# Patient Record
Sex: FEMALE | Race: ASIAN | Employment: STUDENT | URBAN - METROPOLITAN AREA
[De-identification: names, ages, dates, MRNs, and addresses within clinical notes are randomized per-mention and may not be internally consistent; named-entity substitution may affect disease eponyms.]

---
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
## 2022-01-04 ENCOUNTER — TELEMEDICINE (OUTPATIENT)
Dept: FAMILY MEDICINE CLINIC | Facility: CLINIC | Age: 22
End: 2022-01-04
Payer: COMMERCIAL

## 2022-01-04 DIAGNOSIS — N93.9 ABNORMAL UTERINE BLEEDING: ICD-10-CM

## 2022-01-04 DIAGNOSIS — Z30.09 GENERAL COUNSELING FOR INITIATION OF OTHER CONTRACEPTIVE MEASURES: ICD-10-CM

## 2022-01-04 DIAGNOSIS — R30.0 DYSURIA: Primary | ICD-10-CM

## 2022-01-04 PROCEDURE — 99212 OFFICE O/P EST SF 10 MIN: CPT | Performed by: STUDENT IN AN ORGANIZED HEALTH CARE EDUCATION/TRAINING PROGRAM

## 2022-01-04 NOTE — PROGRESS NOTES
Virtual Regular Visit    Verification of patient location:    Patient is located in the following state in which I hold an active license [place]      Assessment/Plan:    Problem List Items Addressed This Visit     None      Visit Diagnoses     Dysuria    -  Primary    Relevant Orders    UA w Reflex to Microscopic w Reflex to Culture -Lab Collect    Chlamydia/GC amplified DNA by PCR    HIV 1/2 Antigen/Antibody (4th Generation) w Reflex [facility]    RPR    Abnormal uterine bleeding        General counseling for initiation of other contraceptive measures          · For the dysuria will order UA with reflex to culture and STD testings to rule out any infectious causes  · For the abnormal uterine bleeding, patient is to keep a log of symptoms, as it has only occurred once previously with the discomfort during voiding  Will rule out infectious causes as well with blood work and urine testing  She would benefit from and in office evaluation for a pelvic and vaginal examination  · Counseled on various ones of contraception, their effectiveness and side effects  As for contraception, she would like to try a copper IUD, unfortunately given her abnormal uterine bleeding she is not a candidate for the copper IUD at this time given the risk of increased bleeding  Will workup causes of her abnormal uterine bleeding and will need to offer another form of contraception in the future  Patient to continue using her usual contraception in the meantime  Counseled patient on safe sex practices to avoid STIs         Reason for visit is   Chief Complaint   Patient presents with    Virtual Regular Visit        Encounter provider Lona Mullins MD    Provider located at [address] [place] [id number]      Recent Visits  Date Type Provider Dept   01/04/22 MD Federico Cash   Showing recent visits within past 7 days and meeting all other requirements  Future Appointments  No visits were found meeting these conditions  Showing future appointments within next 150 days and meeting all other requirements       The patient was identified by name and date of birth  Yvonne Mohan was informed that this is a telemedicine visit and that the visit is being conducted through [address] [facility] and patient was informed that this is a secure, HIPAA-compliant platform  She agrees to proceed     My office door was closed  The patient was notified the following individuals were present in the room JORDYN landaverde  She acknowledged consent and understanding of privacy and security of the video platform  The patient has agreed to participate and understands they can discontinue the visit at any time  Patient is aware this is a billable service  Spring Peacock is a 25 y o  female     Urinary discomfort/spotting   she reports she has been having some spots of blood when using the bathroom  she would see blood on the tissue paper  It occurred 4-5 times last month, and this is the first time this has happened  Is has not occurred again  LMP: started on 1/1/2022  Did not have menstrual period in December 2021  Prior to December her last menstrual period was on 11/01/2021  She reports has a regular cycle, every 30 days, 5-7 days of bleeding, that goes from heavy to light  She into the urgent care for a urine dipstick and the urine pregnancy test was negative  The urine dipstick did show trace blood, no leukocytes or nitrites  Urine culture was negative  She denies any blood in her urine  She reports the following has been great since that 1 episode of the bleeding: On and off dysuria, no urgency or frequency  Normal vaginal discharge without difference in color, no blood  No blood in stool, no rectal pain  No rashes or lesions in the genital area  No fever      She reports she is sexually active with 1 male partner for the past 5 months  She has had 1 other male partner in the past year  Male partner wears condoms  She does not have any history of STDs  Contraception:  Has not been on anything prior  Would want more information  Past Medical History:   Diagnosis Date    Anxiety     Depression     Headache(784 0)        Past Surgical History:   Procedure Laterality Date    APPENDECTOMY      TONSILLECTOMY         Current Outpatient Medications   Medication Sig Dispense Refill    hydrOXYzine HCL (ATARAX) 25 mg tablet TAKE 1 TO 2 TABLETS BY MOUTH EVERY 6 HOURS AS NEEDED FOR 3 DAYS (Patient not taking: Reported on 9/6/2021)      ibuprofen (MOTRIN) 400 mg tablet Take 1 tablet by mouth every 6 (six) hours as needed for mild pain (Patient not taking: Reported on 3/15/2021) 30 tablet 0    sertraline (ZOLOFT) 25 mg tablet Take 25 mg by mouth daily (Patient not taking: Reported on 9/6/2021)       No current facility-administered medications for this visit  No Known Allergies    Review of Systems    Video Exam    There were no vitals filed for this visit  Physical Exam     I spent 15 minutes directly with the patient during this visit    Marianna Luis Manuel [facility] verbally agrees to participate in [employer]  Pt is aware that [employer] could be limited without vital signs or the ability to perform a full hands-on physical Lori Jackson understands she or the provider may request at any time to terminate the video visit and request the patient to seek care or treatment in person

## 2022-02-07 ENCOUNTER — ANNUAL EXAM (OUTPATIENT)
Dept: FAMILY MEDICINE CLINIC | Facility: CLINIC | Age: 22
End: 2022-02-07
Payer: COMMERCIAL

## 2022-02-07 VITALS
DIASTOLIC BLOOD PRESSURE: 70 MMHG | SYSTOLIC BLOOD PRESSURE: 100 MMHG | TEMPERATURE: 97.7 F | RESPIRATION RATE: 16 BRPM | BODY MASS INDEX: 30.79 KG/M2 | WEIGHT: 163.1 LBS | HEIGHT: 61 IN | HEART RATE: 84 BPM | OXYGEN SATURATION: 99 %

## 2022-02-07 DIAGNOSIS — Z72.51 UNPROTECTED SEX: ICD-10-CM

## 2022-02-07 DIAGNOSIS — Z11.3 SCREENING EXAMINATION FOR STD (SEXUALLY TRANSMITTED DISEASE): ICD-10-CM

## 2022-02-07 DIAGNOSIS — R35.0 URINARY FREQUENCY: ICD-10-CM

## 2022-02-07 DIAGNOSIS — Z30.09 BIRTH CONTROL COUNSELING: ICD-10-CM

## 2022-02-07 DIAGNOSIS — Z11.3 SCREENING FOR STDS (SEXUALLY TRANSMITTED DISEASES): Primary | ICD-10-CM

## 2022-02-07 LAB
SL AMB  POCT GLUCOSE, UA: NORMAL
SL AMB LEUKOCYTE ESTERASE,UA: NEGATIVE
SL AMB POCT BILIRUBIN,UA: NEGATIVE
SL AMB POCT BLOOD,UA: NEGATIVE
SL AMB POCT CLARITY,UA: CLEAR
SL AMB POCT COLOR,UA: YELLOW
SL AMB POCT KETONES,UA: NEGATIVE
SL AMB POCT NITRITE,UA: NEGATIVE
SL AMB POCT PH,UA: 6
SL AMB POCT SPECIFIC GRAVITY,UA: 1.01
SL AMB POCT URINE HCG: NEGATIVE
SL AMB POCT URINE PROTEIN: NEGATIVE
SL AMB POCT UROBILINOGEN: NORMAL

## 2022-02-07 PROCEDURE — 99395 PREV VISIT EST AGE 18-39: CPT | Performed by: FAMILY MEDICINE

## 2022-02-07 PROCEDURE — 1036F TOBACCO NON-USER: CPT | Performed by: FAMILY MEDICINE

## 2022-02-07 PROCEDURE — 81002 URINALYSIS NONAUTO W/O SCOPE: CPT | Performed by: FAMILY MEDICINE

## 2022-02-07 PROCEDURE — 3008F BODY MASS INDEX DOCD: CPT | Performed by: FAMILY MEDICINE

## 2022-02-07 PROCEDURE — 81025 URINE PREGNANCY TEST: CPT | Performed by: FAMILY MEDICINE

## 2022-02-07 NOTE — PROGRESS NOTES
Wallace Comes  25 y o   02/08/22      CC: gyn exam   Care gaps addressed this visit: Std screening   Follow up: Mirena     Problem List Items Addressed This Visit     None      Visit Diagnoses     Screening for STDs (sexually transmitted diseases)    -  Primary    Relevant Orders    Chlamydia/GC amplified DNA by PCR    Screening examination for STD (sexually transmitted disease)        Relevant Orders    RPR    Human Immunodeficiency Virus 1/2 Antigen / Antibody ( Fourth Generation) with Reflex Testing    Hepatitis C antibody    Unprotected sex        Relevant Orders    Chlamydia/GC amplified DNA by PCR    POCT urine HCG (Completed)    Urinary frequency        Relevant Orders    POCT urine dip (Completed)    Birth control counseling               Assessment and Plan     Screened for chlamydia and gonorrhea with cervical sample today in the office  Gyn exam was normal   Patient given blood work for STD screening and she is to follow up with scheduling Mirena insertion  In regards to patient's urinary frequency which seems to be intermittent and chronic, I asked patient to monitor her symptoms for now as her UA was normal   If she continues to have issues, she may benefit from a consultation from a urologist for evaluation of interstitial cystitis   Patient understands and agrees with the plan  Plan discussed with attending- Dr Sarah Hurley:      Periods are regular for the most part except for December when she skipped a period and Jan when she had two periods in 1 month  Periods usually last for 3-4 days  Cramping is minimal       She has had 2 male sexual partners  She is currently sexually active with her male boyfriend  They do not use protection She is interested in starting contraception  Patient has been having urinary frequency and urinary discomfort for 3 months  Urine culture in December was negative  Review of Systems   Constitutional: Negative for chills and fever  Respiratory: Negative for shortness of breath  Cardiovascular: Negative for chest pain  Gastrointestinal: Negative for abdominal pain, diarrhea, nausea and vomiting  Genitourinary: Positive for frequency  Negative for difficulty urinating, dyspareunia, dysuria, genital sores, pelvic pain, urgency, vaginal bleeding, vaginal discharge and vaginal pain  Skin: Negative for rash and wound  Neurological: Negative for dizziness, syncope and headaches  The following portions of the patient's history were reviewed and updated as appropriate:  current medications, past family history, past medical history, past social history, past surgical history and problem list     Current Outpatient Medications on File Prior to Visit   Medication Sig Dispense Refill    hydrOXYzine HCL (ATARAX) 25 mg tablet TAKE 1 TO 2 TABLETS BY MOUTH EVERY 6 HOURS AS NEEDED FOR 3 DAYS (Patient not taking: Reported on 9/6/2021)      ibuprofen (MOTRIN) 400 mg tablet Take 1 tablet by mouth every 6 (six) hours as needed for mild pain (Patient not taking: Reported on 3/15/2021) 30 tablet 0    sertraline (ZOLOFT) 25 mg tablet Take 25 mg by mouth daily (Patient not taking: Reported on 9/6/2021)       No current facility-administered medications on file prior to visit  Objective:    /70 (BP Location: Left arm, Patient Position: Sitting, Cuff Size: Standard)   Pulse 84   Temp 97 7 °F (36 5 °C) (Tympanic)   Resp 16   Ht 5' 1" (1 549 m)   Wt 74 kg (163 lb 1 6 oz)   SpO2 99%   BMI 30 82 kg/m²     Physical Exam  Constitutional:       General: She is not in acute distress  Appearance: She is not ill-appearing  HENT:      Head: Normocephalic and atraumatic  Right Ear: External ear normal       Left Ear: External ear normal       Nose: Nose normal    Cardiovascular:      Rate and Rhythm: Normal rate and regular rhythm  Pulmonary:      Effort: Pulmonary effort is normal  No respiratory distress     Abdominal: Hernia: There is no hernia in the left inguinal area or right inguinal area  Genitourinary:     Labia:         Right: No rash, tenderness, lesion or injury  Left: No rash, tenderness, lesion or injury  Vagina: Normal       Cervix: Normal       Uterus: Normal        Adnexa: Right adnexa normal and left adnexa normal    Lymphadenopathy:      Lower Body: No right inguinal adenopathy  No left inguinal adenopathy  Skin:     General: Skin is warm and dry  Neurological:      General: No focal deficit present  Mental Status: She is alert and oriented to person, place, and time  Psychiatric:         Mood and Affect: Mood normal          Behavior: Behavior normal          Thought Content: Thought content normal          Judgment: Judgment normal                    Some portions of this record may have been generated with voice recognition software  There may be translation, syntax, or grammatical errors  Occasional wrong word or "sound-a-like" substitutions may have occurred due to the inherent limitations of the voice recognition software       [address] [facility]   PGY3

## 2022-02-09 LAB
C TRACH RRNA SPEC QL NAA+PROBE: NEGATIVE
N GONORRHOEA RRNA SPEC QL NAA+PROBE: NEGATIVE

## 2022-03-17 ENCOUNTER — PROCEDURE VISIT (OUTPATIENT)
Dept: FAMILY MEDICINE CLINIC | Facility: CLINIC | Age: 22
End: 2022-03-17
Payer: COMMERCIAL

## 2022-03-17 VITALS
OXYGEN SATURATION: 99 % | HEIGHT: 61 IN | TEMPERATURE: 97.8 F | WEIGHT: 162 LBS | HEART RATE: 87 BPM | SYSTOLIC BLOOD PRESSURE: 120 MMHG | BODY MASS INDEX: 30.58 KG/M2 | DIASTOLIC BLOOD PRESSURE: 80 MMHG | RESPIRATION RATE: 18 BRPM

## 2022-03-17 DIAGNOSIS — Z78.9 USES CONTRACEPTIVE IMPLANT FOR BIRTH CONTROL: Primary | ICD-10-CM

## 2022-03-17 DIAGNOSIS — Z30.430 VISIT FOR INSERTION OF INTRAUTERINE DEVICE: ICD-10-CM

## 2022-03-17 LAB — SL AMB POCT URINE HCG: NEGATIVE

## 2022-03-17 PROCEDURE — 58300 INSERT INTRAUTERINE DEVICE: CPT | Performed by: OBSTETRICS & GYNECOLOGY

## 2022-03-17 PROCEDURE — 81025 URINE PREGNANCY TEST: CPT | Performed by: OBSTETRICS & GYNECOLOGY

## 2022-03-17 PROCEDURE — 3008F BODY MASS INDEX DOCD: CPT | Performed by: FAMILY MEDICINE

## 2022-03-17 NOTE — PROGRESS NOTES
CPT CODE       Ventura Nayak is a 24 yo F here for IUD insertion  Patient is a , previously using condoms for birth control  She last had intercourse 11 days ago  It was decided to use [place] instead of Mirena as patient is nulliparous and Merdis Bean is a smaller device  Patient understands she is to come back in 1 month for a string check  Iud insertions    Date/Time: 3/17/2022 2:51 PM  Performed by: Mary Cam DO  Authorized by: Mary Cam DO   Universal Protocol:  Consent: Verbal consent obtained  Written consent obtained    Risks and benefits: risks, benefits and alternatives were discussed  Consent given by: patient  Patient understanding: patient states understanding of the procedure being performed  Patient consent: the patient's understanding of the procedure matches consent given  Procedure consent: procedure consent matches procedure scheduled  Relevant documents: relevant documents present and verified  Test results: test results available and properly labeled  Required items: required blood products, implants, devices, and special equipment available  Patient identity confirmed: verbally with patient        Procedure:     Pelvic exam performed: yes      Negative GC/chlamydia test: yes      Negative urine pregnancy test: yes      Negative serum pregnancy test: no      Cervix cleaned and prepped: yes      Speculum placed in vagina: yes      Tenaculum applied to cervix: yes      Uterus sounded: yes      Uterus sound depth (cm):  7    IUD inserted with no complications: yes      IUD type: Merdis Bean     Strings trimmed: yes    Post-procedure:     Patient tolerated procedure well: yes      Patient will follow up after next period: yes

## 2022-03-30 ENCOUNTER — APPOINTMENT (EMERGENCY)
Dept: RADIOLOGY | Facility: HOSPITAL | Age: 22
End: 2022-03-30
Payer: COMMERCIAL

## 2022-03-30 ENCOUNTER — HOSPITAL ENCOUNTER (EMERGENCY)
Facility: HOSPITAL | Age: 22
Discharge: HOME | End: 2022-03-30
Attending: EMERGENCY MEDICINE | Admitting: EMERGENCY MEDICINE
Payer: COMMERCIAL

## 2022-03-30 VITALS
HEART RATE: 115 BPM | TEMPERATURE: 100.3 F | RESPIRATION RATE: 20 BRPM | OXYGEN SATURATION: 100 % | DIASTOLIC BLOOD PRESSURE: 69 MMHG | SYSTOLIC BLOOD PRESSURE: 125 MMHG

## 2022-03-30 DIAGNOSIS — J02.9 PHARYNGITIS: Primary | ICD-10-CM

## 2022-03-30 LAB — S PYO DNA THROAT QL NAA+PROBE: NOT DETECTED

## 2022-03-30 PROCEDURE — 87651 STREP A DNA AMP PROBE: CPT | Performed by: EMERGENCY MEDICINE

## 2022-03-30 PROCEDURE — 36415 COLL VENOUS BLD VENIPUNCTURE: CPT | Performed by: EMERGENCY MEDICINE

## 2022-03-30 PROCEDURE — 87636 SARSCOV2 & INF A&B AMP PRB: CPT | Performed by: EMERGENCY MEDICINE

## 2022-03-30 PROCEDURE — 71045 X-RAY EXAM CHEST 1 VIEW: CPT

## 2022-03-30 PROCEDURE — 99285 EMERGENCY DEPT VISIT HI MDM: CPT | Performed by: EMERGENCY MEDICINE

## 2022-03-30 PROCEDURE — 86308 HETEROPHILE ANTIBODY SCREEN: CPT | Performed by: EMERGENCY MEDICINE

## 2022-03-30 PROCEDURE — 99284 EMERGENCY DEPT VISIT MOD MDM: CPT

## 2022-03-30 RX ORDER — IBUPROFEN 600 MG/1
600 TABLET ORAL ONCE
Status: COMPLETED | OUTPATIENT
Start: 2022-03-30 | End: 2022-03-30

## 2022-03-30 RX ADMIN — IBUPROFEN 600 MG: 600 TABLET ORAL at 23:02

## 2022-03-30 NOTE — Clinical Note
Amber Iqbal was seen and treated in our emergency department on 3/30/2022  Diagnosis:     Ervin Abad  may return to school on return date, may return to work on return date  She may return on this date: If you have any questions or concerns, please don't hesitate to call        Janie Ramirez RN    ______________________________           _______________          _______________  [facility] Representative                              Date                                Time

## 2022-03-30 NOTE — Clinical Note
Minesh Kaiser was seen and treated in our emergency department on 3/30/2022  Diagnosis:     Cary Baker  may return to school on return date, may return to work on return date  She may return on this date: If you have any questions or concerns, please don't hesitate to call        Ramon Resendez RN    ______________________________           _______________          _______________  [facility]                              Date                                Time

## 2022-03-30 NOTE — Clinical Note
Nilson Prather was seen and treated in our emergency department on 3/30/2022  Diagnosis:     Jenny Escobar    She may return on this date: 04/01/2022         If you have any questions or concerns, please don't hesitate to call        Arturo Westbrook RN    ______________________________           _______________          _______________  [facility] Representative                              Date                                Time

## 2022-03-31 LAB
FLUAV RNA RESP QL NAA+PROBE: NEGATIVE
FLUBV RNA RESP QL NAA+PROBE: NEGATIVE
HETEROPH AB SER QL: NEGATIVE
SARS-COV-2 RNA RESP QL NAA+PROBE: NEGATIVE

## 2022-03-31 NOTE — ED PROVIDER NOTES
History  Chief Complaint   Patient presents with    Sore Throat     sore throat since today  states throat is bleeding    Dizziness     some dizziness started today as well     Patient here with complaint of sore throat x1 day associated with dysphagia for solids and liquids  Patient is able to tolerate her oral secretions  She denies sick contacts  She has not taken any medication for pain relief  She also complains of a productive cough and intermittent dizziness  Patient has a prior medical history of anxiety and depression  History provided by:  Patient   used: No    Sore Throat  Location:  Generalized  Quality:  Burning  Severity:  Mild  Onset quality:  Sudden  Timing:  Constant  Progression:  Unchanged  Chronicity:  New  Relieved by:  Nothing  Worsened by:  Drinking, eating and swallowing  Ineffective treatments:  None tried  Associated symptoms: cough    Associated symptoms: no abdominal pain, no chills, no fever, no neck stiffness and no shortness of breath        Prior to Admission Medications   Prescriptions Last Dose Informant Patient Reported?  Taking?   hydrOXYzine HCL (ATARAX) 25 mg tablet   Yes No   Sig: TAKE 1 TO 2 TABLETS BY MOUTH EVERY 6 HOURS AS NEEDED FOR 3 DAYS   Patient not taking: Reported on 9/6/2021   ibuprofen (MOTRIN) 400 mg tablet   No No   Sig: Take 1 tablet by mouth every 6 (six) hours as needed for mild pain   Patient not taking: Reported on 3/15/2021   sertraline (ZOLOFT) 25 mg tablet   Yes No   Sig: Take 25 mg by mouth daily   Patient not taking: Reported on 9/6/2021      Facility-Administered Medications: None       Past Medical History:   Diagnosis Date    Anxiety     Depression     Headache(784 0)        Past Surgical History:   Procedure Laterality Date    APPENDECTOMY      TONSILLECTOMY         Family History   Problem Relation Age of Onset    No Known Problems Mother     No Known Problems Father     No Known Problems Brother     Diabetes Paternal Grandmother     Heart disease Paternal Grandfather     No Known Problems Brother      I have reviewed and agree with the history as documented  E-Cigarette/Vaping    E-Cigarette Use Never User      E-Cigarette/Vaping Substances     Social History     Tobacco Use    Smoking status: Never Smoker    Smokeless tobacco: Never Used   Vaping Use    Vaping Use: Never used   Substance Use Topics    Alcohol use: Not Currently    Drug use: Not Currently       Review of Systems   Constitutional: Negative for chills and fever  HENT: Positive for sore throat  Respiratory: Positive for cough  Negative for chest tightness and shortness of breath  Gastrointestinal: Negative for abdominal pain, diarrhea, nausea and vomiting  Genitourinary: Negative for dysuria, frequency, hematuria and urgency  Musculoskeletal: Negative for back pain, neck pain and neck stiffness  All other systems reviewed and are negative  Physical Exam  Physical Exam  Vitals and nursing note reviewed  Constitutional:       General: She is not in acute distress  Appearance: She is well-developed  She is not diaphoretic  HENT:      Head: Normocephalic and atraumatic  Mouth/Throat:      Pharynx: Posterior oropharyngeal erythema present  Tonsils: No tonsillar exudate  Eyes:      Conjunctiva/sclera: Conjunctivae normal       Pupils: Pupils are equal, round, and reactive to light  Cardiovascular:      Rate and Rhythm: Normal rate and regular rhythm  Heart sounds: Normal heart sounds  No murmur heard  Pulmonary:      Effort: Pulmonary effort is normal  No respiratory distress  Breath sounds: Normal breath sounds  Abdominal:      General: Bowel sounds are normal  There is no distension  Palpations: Abdomen is soft  Tenderness: There is no abdominal tenderness  Musculoskeletal:         General: No deformity  Normal range of motion        Cervical back: Normal range of motion and neck supple  Skin:     General: Skin is warm and dry  Capillary Refill: Capillary refill takes less than 2 seconds  Coloration: Skin is not pale  Findings: No rash  Neurological:      General: No focal deficit present  Mental Status: She is alert and oriented to person, place, and time  Cranial Nerves: No cranial nerve deficit  Psychiatric:         Behavior: Behavior normal          Vital Signs  ED Triage Vitals [03/30/22 1952]   Temperature Pulse Respirations Blood Pressure SpO2   100 3 °F (37 9 °C) (!) 115 20 125/69 100 %      Temp Source Heart Rate Source Patient Position - Orthostatic VS BP Location FiO2 (%)   Tympanic Monitor Sitting Right arm --      Pain Score       8           Vitals:    03/30/22 1952   BP: 125/69   Pulse: (!) 115   Patient Position - Orthostatic VS: Sitting         Visual Acuity      ED Medications  Medications   ibuprofen (MOTRIN) tablet 600 mg (600 mg Oral Given 3/30/22 2302)       Diagnostic Studies  Results Reviewed     Procedure Component Value Units Date/Time    Mononucleosis screen [[id number]]  (Normal) Collected: 03/30/22 2152    Lab Status: Final result Specimen: Blood from Arm, Left Updated: 03/31/22 1600     Monotest Negative    COVID/FLU - 24 hour TAT [[id number]]  (Normal) Collected: 03/30/22 2152    Lab Status: Final result Specimen: Nares from Nose Updated: 03/31/22 1233     SARS-CoV-2 Negative     INFLUENZA A PCR Negative     INFLUENZA B PCR Negative    Narrative:      FOR PEDIATRIC PATIENTS - copy/paste COVID Guidelines URL to browser: [email or web address] [email or web address]  [email or web address]    SARS-CoV-2 assay is a Nucleic Acid Amplification assay intended for the  qualitative detection of nucleic acid from SARS-CoV-2 in nasopharyngeal  swabs  Results are for the presumptive identification of SARS-CoV-2 RNA      Positive results are indicative of infection with SARS-CoV-2, the virus  causing COVID-19, but do not rule out bacterial infection or co-infection  with other viruses  Laboratories within the [place] and its  territories are required to report all positive results to the appropriate  public health authorities  Negative results do not preclude SARS-CoV-2  infection and should not be used as the sole basis for treatment or other  patient management decisions  Negative results must be combined with  clinical observations, patient history, and epidemiological information  This test has not been FDA cleared or approved  This test has been authorized by FDA under an Emergency Use Authorization  (EUA)  This test is only authorized for the duration of time the  declaration that circumstances exist justifying the authorization of the  emergency use of an in vitro diagnostic tests for detection of SARS-CoV-2  virus and/or diagnosis of COVID-19 infection under section 564(b)(1) of  the Act, 21 U  S C  433GUV-3(Q)(7), unless the authorization is terminated  or revoked sooner  The test has been validated but independent review by FDA  and CLIA is pending  Test performed using [employer] Gene[employer]pert: This RT-PCR assay targets N2,  a region unique to SARS-CoV-2  A conserved region in the E-gene was chosen  for pan-Sarbecovirus detection which includes SARS-CoV-2  Strep A PCR [[id number]]  (Normal) Collected: 03/30/22 2152    Lab Status: Final result Specimen: Throat Updated: 03/30/22 2227     STREP A PCR Not Detected                 XR chest 1 view portable   ED Interpretation by Jermaine Downey DO (03/30 2130)   nad      Final Result by Patricia Muñoz MD (03/30 2346)      No acute cardiopulmonary disease                    Workstation performed: [id number]                    Procedures  Procedures         ED Course                                             MDM  Number of Diagnoses or Management Options  Pharyngitis: new and requires workup     Amount and/or Complexity of Data Reviewed  Clinical lab tests: ordered and reviewed  Tests in the radiology section of CPT®: ordered and reviewed    Risk of Complications, Morbidity, and/or Mortality  Presenting problems: high  Diagnostic procedures: high  Management options: high    Patient Progress  Patient progress: stable      Disposition  Final diagnoses:   Pharyngitis     Time reflects when diagnosis was documented in both MDM as applicable and the Disposition within this note     Time User Action Codes Description Comment    3/30/2022 10:55 PM Cleve Yuen Add [J02 9] Pharyngitis       ED Disposition     ED Disposition Condition Date/Time Comment    Discharge Stable Wed Mar 30, 2022 10:55 PM Daniel Borja discharge to home/self care  Follow-up Information     Follow up With Specialties Details Why Contact Info Additional Information    [facility] ENT [facility] Otolaryngology Schedule an appointment as soon as possible for a visit in 2 days for follow up [facility] [address]  [address] E[address] Jose Luis Fluke [facility], [address] [place] [address], [id number], [phone]          Discharge Medication List as of 3/30/2022 10:56 PM      CONTINUE these medications which have NOT CHANGED    Details   hydrOXYzine HCL (ATARAX) 25 mg tablet TAKE 1 TO 2 TABLETS BY MOUTH EVERY 6 HOURS AS NEEDED FOR 3 DAYS, Historical Med      ibuprofen (MOTRIN) 400 mg tablet Take 1 tablet by mouth every 6 (six) hours as needed for mild pain, Starting Thu 1/4/2018, Print      sertraline (ZOLOFT) 25 mg tablet Take 25 mg by mouth daily, Starting Tue 2/23/2021, Historical Med             No discharge procedures on file      PDMP Review     None          ED Provider  Electronically Signed by           Karl Abreu DO  04/01/22 0030

## 2022-03-31 NOTE — DISCHARGE INSTRUCTIONS
Return to the ER for further concerns or worsening symptoms  Follow up with your primary care physician in 1-2 days  Take tylenol or ibuprofen for pain relief  You have a pending COVID/Flu and Mono spot tests   You will be notified when the test result is available

## 2022-04-01 ENCOUNTER — HOSPITAL ENCOUNTER (EMERGENCY)
Facility: HOSPITAL | Age: 22
Discharge: HOME | End: 2022-04-01
Attending: EMERGENCY MEDICINE | Admitting: EMERGENCY MEDICINE
Payer: COMMERCIAL

## 2022-04-01 VITALS
TEMPERATURE: 100 F | SYSTOLIC BLOOD PRESSURE: 119 MMHG | BODY MASS INDEX: 29.74 KG/M2 | DIASTOLIC BLOOD PRESSURE: 65 MMHG | RESPIRATION RATE: 20 BRPM | WEIGHT: 157.4 LBS | HEART RATE: 107 BPM | OXYGEN SATURATION: 98 %

## 2022-04-01 DIAGNOSIS — J06.9 URI (UPPER RESPIRATORY INFECTION): ICD-10-CM

## 2022-04-01 DIAGNOSIS — J02.9 PHARYNGITIS: Primary | ICD-10-CM

## 2022-04-01 PROCEDURE — 99284 EMERGENCY DEPT VISIT MOD MDM: CPT | Performed by: EMERGENCY MEDICINE

## 2022-04-01 PROCEDURE — 99282 EMERGENCY DEPT VISIT SF MDM: CPT

## 2022-04-01 RX ORDER — FLUTICASONE PROPIONATE 50 MCG
1 SPRAY, SUSPENSION (ML) NASAL DAILY
Qty: 16 G | Refills: 0 | Status: SHIPPED | OUTPATIENT
Start: 2022-04-01

## 2022-04-01 RX ORDER — BENZONATATE 100 MG/1
100 CAPSULE ORAL EVERY 8 HOURS
Qty: 21 CAPSULE | Refills: 0 | Status: SHIPPED | OUTPATIENT
Start: 2022-04-01

## 2022-04-01 NOTE — ED PROVIDER NOTES
History  Chief Complaint   Patient presents with    Sore Throat     States seen here on 3/30 for sore throat and cough and cough and sore throat are worse     HPI  Patient is a 53-year-old female presenting for re-evaluation rhinorrhea, sore throat, minimally productive cough  Patient was evaluated on 03/30 for same symptoms, had mono test, strep swab, as well as swab for COVID/influenza which were all negative  Patient states continued symptoms and states that her voice is more hoarse and her throat is more sore  Patient continues to eat, drink, urinate, stool normally  Patient denies any significant shortness of breath, chest pain, palpitations, diaphoresis, syncope or near syncope  Patient states she has numerous sick contacts at work  None       Past Medical History:   Diagnosis Date    Anxiety     Depression     Headache(784 0)        Past Surgical History:   Procedure Laterality Date    APPENDECTOMY      TONSILLECTOMY         Family History   Problem Relation Age of Onset    No Known Problems Mother     No Known Problems Father     No Known Problems Brother     Diabetes Paternal Grandmother     Heart disease Paternal Grandfather     No Known Problems Brother      I have reviewed and agree with the history as documented  E-Cigarette/Vaping    E-Cigarette Use Never User      E-Cigarette/Vaping Substances     Social History     Tobacco Use    Smoking status: Never Smoker    Smokeless tobacco: Never Used   Vaping Use    Vaping Use: Never used   Substance Use Topics    Alcohol use: Not Currently    Drug use: Not Currently       Review of Systems   Constitutional: Negative for chills, fatigue and fever  HENT: Positive for postnasal drip and sore throat  Eyes: Negative for visual disturbance  Respiratory: Positive for cough  Negative for chest tightness and shortness of breath  Cardiovascular: Negative for chest pain     Gastrointestinal: Negative for abdominal distention, abdominal pain, constipation, diarrhea, nausea and vomiting  Endocrine: Negative for polydipsia and polyuria  Genitourinary: Negative for dysuria and hematuria  Musculoskeletal: Negative for arthralgias and myalgias  Skin: Negative for color change and rash  Neurological: Negative for dizziness and headaches  Psychiatric/Behavioral: Negative for confusion  All other systems reviewed and are negative  Physical Exam  Physical Exam  Vitals reviewed  Constitutional:       General: She is not in acute distress  Appearance: She is well-developed  She is not diaphoretic  Comments: Well-appearing, nondistressed   HENT:      Head: Normocephalic and atraumatic  Comments: Moist mucous membranes  Trace mauri pharyngeal erythema without tonsillar swelling or exudate  Right Ear: External ear normal       Left Ear: External ear normal       Nose: Nose normal       Mouth/Throat:      Pharynx: No oropharyngeal exudate  Eyes:      Pupils: Pupils are equal, round, and reactive to light  Cardiovascular:      Rate and Rhythm: Normal rate and regular rhythm  Heart sounds: Normal heart sounds  No murmur heard  No friction rub  No gallop  Pulmonary:      Effort: Pulmonary effort is normal  No respiratory distress  Breath sounds: Normal breath sounds  No wheezing  Comments: No increased work of breathing  Satting 98% on room air indicating adequate oxygenation  Lungs clear to auscultation without wheezes, rales, rhonchi  Chest:      Chest wall: No tenderness  Abdominal:      General: Bowel sounds are normal  There is no distension  Palpations: Abdomen is soft  There is no mass  Tenderness: There is no abdominal tenderness  There is no guarding  Musculoskeletal:         General: No deformity  Normal range of motion  Cervical back: Normal range of motion  Lymphadenopathy:      Cervical: No cervical adenopathy  Skin:     General: Skin is warm and dry  Capillary Refill: Capillary refill takes less than 2 seconds  Neurological:      Mental Status: She is alert and oriented to person, place, and time  Vital Signs  ED Triage Vitals [04/01/22 1419]   Temperature Pulse Respirations Blood Pressure SpO2   100 °F (37 8 °C) (!) 107 20 119/65 98 %      Temp Source Heart Rate Source Patient Position - Orthostatic VS BP Location FiO2 (%)   Tympanic Monitor Sitting Left arm --      Pain Score       9           Vitals:    04/01/22 1419   BP: 119/65   Pulse: (!) 107   Patient Position - Orthostatic VS: Sitting         Visual Acuity      ED Medications  Medications - No data to display    Diagnostic Studies  Results Reviewed     None                 No orders to display              Procedures  Procedures         ED Course                                             MDM  Number of Diagnoses or Management Options  Pharyngitis  URI (upper respiratory infection)  Diagnosis management comments: Continued URI symptoms, previous testing for mono, influenza, COVID, strep all negative  Grossly benign examination  Patient provided with reassurance, prescription for Flonase, Tessalon Perles, instructions to soothe her throat with honey, discharged with return precautions  Disposition  Final diagnoses:   Pharyngitis   URI (upper respiratory infection)     Time reflects when diagnosis was documented in both MDM as applicable and the Disposition within this note     Time User Action Codes Description Comment    4/1/2022  3:16 PM Laura Ramos Add [J02 9] Pharyngitis     4/1/2022  3:16 PM Laura Ramos Add [J06 9] URI (upper respiratory infection)       ED Disposition     ED Disposition Condition Date/Time Comment    Discharge Stable Fri Apr 1, 2022  3:15 PM Anna Zavala discharge to home/self care              Follow-up Information     Follow up With Specialties Details Why Contact Info Additional [address] [facility] Department Emergency Medicine  If symptoms worsen [address] [address]  [address] [address] Emergency Department, Elenita Mcnamara, [place], [address]          Discharge Medication List as of 4/1/2022  3:18 PM      START taking these medications    Details   benzonatate (TESSALON PERLES) 100 mg capsule Take 1 capsule (100 mg total) by mouth every 8 (eight) hours, Starting Fri 4/1/2022, Normal      fluticasone (FLONASE) 50 mcg/act nasal spray 1 spray into each nostril daily, Starting Fri 4/1/2022, Normal             No discharge procedures on file      PDMP Review     None          ED Provider  Electronically Signed by           Effie Steinberg MD  04/01/22 0338

## 2022-04-01 NOTE — Clinical Note
Reyes Grays was seen and treated in our emergency department on 4/1/2022  Diagnosis:     Rocky Ngo    She may return on this date: 04/03/2022         If you have any questions or concerns, please don't hesitate to call        Myron Mac MD    ______________________________           _______________          _______________  Hospital Representative                              Date                                Time

## 2022-04-01 NOTE — DISCHARGE INSTRUCTIONS
Use the prescribed Flonase, Tessalon Perles for runny nose, postnasal drip, cough  Drink lots of fluids  We recommend a dark honey to soothe your throat as well  Continue to rest   Your symptoms are likely being caused by a virus and will resolve in the next few days

## 2022-04-25 ENCOUNTER — OFFICE VISIT (OUTPATIENT)
Dept: FAMILY MEDICINE CLINIC | Facility: CLINIC | Age: 22
End: 2022-04-25
Payer: COMMERCIAL

## 2022-04-25 VITALS
DIASTOLIC BLOOD PRESSURE: 70 MMHG | RESPIRATION RATE: 18 BRPM | WEIGHT: 158 LBS | SYSTOLIC BLOOD PRESSURE: 102 MMHG | HEART RATE: 94 BPM | BODY MASS INDEX: 29.83 KG/M2 | HEIGHT: 61 IN | TEMPERATURE: 96.7 F | OXYGEN SATURATION: 99 %

## 2022-04-25 DIAGNOSIS — Z30.431 IUD CHECK UP: Primary | ICD-10-CM

## 2022-04-25 PROCEDURE — 99213 OFFICE O/P EST LOW 20 MIN: CPT | Performed by: FAMILY MEDICINE

## 2022-04-25 PROCEDURE — 3008F BODY MASS INDEX DOCD: CPT | Performed by: FAMILY MEDICINE

## 2022-04-25 NOTE — PROGRESS NOTES
Graham Otero  25 y o   04/25/22      CC: IUD string check     Assessment and Plan     Problem List Items Addressed This Visit     None      Visit Diagnoses     IUD check up    -  Primary          Strings push to the back of patient's cervix so partner should not feel anything moving forward  Strings are correct length   Patient understands and agrees with the plan  Plan discussed with attending- Dr Gelacio To:     Patient is here for an IUD check  Patient had IUD placed on 03/17/2022  Patient was on her period when the ID was placed and she continued to have bleeding for 3 weeks post insertion  She states that the bleeding just recently stopped  She states that her partner can feel the strings at that they are bothersome to him  Other ROS is negative    The following portions of the patient's history were reviewed and updated as appropriate:  current medications, past family history, past medical history, past social history, past surgical history and problem list     Current Outpatient Medications on File Prior to Visit   Medication Sig Dispense Refill    benzonatate (TESSALON PERLES) 100 mg capsule Take 1 capsule (100 mg total) by mouth every 8 (eight) hours (Patient not taking: Reported on 4/25/2022 ) 21 capsule 0    fluticasone (FLONASE) 50 mcg/act nasal spray 1 spray into each nostril daily (Patient not taking: Reported on 4/25/2022 ) 16 g 0     No current facility-administered medications on file prior to visit  Objective:    /70 (BP Location: Left arm, Patient Position: Sitting, Cuff Size: Standard)   Pulse 94   Temp (!) 96 7 °F (35 9 °C) (Tympanic)   Resp 18   Ht 5' 1" (1 549 m)   Wt 71 7 kg (158 lb)   LMP 04/06/2022   SpO2 99%   BMI 29 85 kg/m²     Physical Exam  Constitutional:       General: She is not in acute distress  Appearance: She is not ill-appearing  HENT:      Head: Normocephalic and atraumatic        Right Ear: External ear normal  Left Ear: External ear normal       Nose: Nose normal    Pulmonary:      Effort: Pulmonary effort is normal  No respiratory distress  Genitourinary:     Comments: IUD strings in place approx 3 cm long, no part of device coming through cervical os  Skin:     General: Skin is warm and dry  Neurological:      General: No focal deficit present  Mental Status: She is alert and oriented to person, place, and time  Psychiatric:         Mood and Affect: Mood normal          Behavior: Behavior normal          Thought Content: Thought content normal          Judgment: Judgment normal                    Some portions of this record may have been generated with voice recognition software  There may be translation, syntax, or grammatical errors  Occasional wrong word or "sound-a-like" substitutions may have occurred due to the inherent limitations of the voice recognition software       [address] [facility] Medicine   PGY3

## 2022-09-26 ENCOUNTER — HOSPITAL ENCOUNTER (EMERGENCY)
Facility: HOSPITAL | Age: 22
Discharge: HOME | End: 2022-09-26
Attending: EMERGENCY MEDICINE
Payer: MEDICAID

## 2022-09-26 VITALS
DIASTOLIC BLOOD PRESSURE: 68 MMHG | BODY MASS INDEX: 31.37 KG/M2 | HEART RATE: 95 BPM | RESPIRATION RATE: 18 BRPM | OXYGEN SATURATION: 100 % | SYSTOLIC BLOOD PRESSURE: 105 MMHG | WEIGHT: 166 LBS | TEMPERATURE: 98.2 F

## 2022-09-26 DIAGNOSIS — J06.9 URI (UPPER RESPIRATORY INFECTION): Primary | ICD-10-CM

## 2022-09-26 LAB — SARS-COV-2 RNA RESP QL NAA+PROBE: NEGATIVE

## 2022-09-26 PROCEDURE — U0003 INFECTIOUS AGENT DETECTION BY NUCLEIC ACID (DNA OR RNA); SEVERE ACUTE RESPIRATORY SYNDROME CORONAVIRUS 2 (SARS-COV-2) (CORONAVIRUS DISEASE [COVID-19]), AMPLIFIED PROBE TECHNIQUE, MAKING USE OF HIGH THROUGHPUT TECHNOLOGIES AS DESCRIBED BY CMS-2020-01-R: HCPCS | Performed by: EMERGENCY MEDICINE

## 2022-09-26 PROCEDURE — 99282 EMERGENCY DEPT VISIT SF MDM: CPT | Performed by: EMERGENCY MEDICINE

## 2022-09-26 PROCEDURE — U0005 INFEC AGEN DETEC AMPLI PROBE: HCPCS | Performed by: EMERGENCY MEDICINE

## 2022-09-26 PROCEDURE — 99283 EMERGENCY DEPT VISIT LOW MDM: CPT

## 2022-09-26 NOTE — Clinical Note
Abigail Castellanosadri was seen and treated in our emergency department on 9/26/2022  Diagnosis:     Nella Gaucher  may return to work on return date  She may return on this date: 09/28/2022         If you have any questions or concerns, please don't hesitate to call        Montez Root MD    ______________________________           _______________          _______________  Hospital Representative                              Date                                Time

## 2022-09-26 NOTE — ED PROVIDER NOTES
History  Chief Complaint   Patient presents with    Flu Symptoms     Reported that she has been having headache, congestion, cough, nausea  Work at [employer]  Started with sore throat yesterday  Patient is a 72-year-old female  She presents to the emergency room complaining of headache, cough and congestion, nausea, and dizziness  She denies fevers  No chest pain or trouble breathing  No abdominal pain, vomiting or diarrhea  No urinary complaints  She did have a sore throat yesterday, but that resolved  She works at a   Symptoms are moderate in severity without aggravating or relieving factors  Prior to Admission Medications   Prescriptions Last Dose Informant Patient Reported? Taking?   benzonatate (TESSALON PERLES) 100 mg capsule Not Taking at Unknown time  No No   Sig: Take 1 capsule (100 mg total) by mouth every 8 (eight) hours   Patient not taking: No sig reported   fluticasone (FLONASE) 50 mcg/act nasal spray Not Taking at Unknown time  No No   Si spray into each nostril daily   Patient not taking: No sig reported      Facility-Administered Medications: None       Past Medical History:   Diagnosis Date    Anxiety     Depression     Headache(784 0)        Past Surgical History:   Procedure Laterality Date    APPENDECTOMY      TONSILLECTOMY         Family History   Problem Relation Age of Onset    No Known Problems Mother     No Known Problems Father     No Known Problems Brother     Diabetes Paternal Grandmother     Heart disease Paternal Grandfather     No Known Problems Brother      I have reviewed and agree with the history as documented      E-Cigarette/Vaping    E-Cigarette Use Never User      E-Cigarette/Vaping Substances     Social History     Tobacco Use    Smoking status: Never Smoker    Smokeless tobacco: Never Used   Vaping Use    Vaping Use: Never used   Substance Use Topics    Alcohol use: Not Currently    Drug use: Not Currently       Review of Systems   Constitutional: Positive for chills  Negative for fever  HENT: Positive for congestion, rhinorrhea and sore throat  Eyes: Negative for pain, redness and visual disturbance  Respiratory: Positive for cough  Negative for shortness of breath  Cardiovascular: Negative for chest pain and leg swelling  Gastrointestinal: Positive for nausea  Negative for abdominal pain, diarrhea and vomiting  Endocrine: Negative for polydipsia and polyuria  Genitourinary: Negative for dysuria, frequency, hematuria, vaginal bleeding and vaginal discharge  Musculoskeletal: Negative for back pain and neck pain  Skin: Negative for rash and wound  Allergic/Immunologic: Negative for immunocompromised state  Neurological: Positive for dizziness and headaches  Negative for weakness and numbness  Hematological: Does not bruise/bleed easily  Psychiatric/Behavioral: Negative for hallucinations and suicidal ideas  All other systems reviewed and are negative  Physical Exam  Physical Exam  Vitals reviewed  Constitutional:       General: She is not in acute distress  Appearance: She is not ill-appearing or diaphoretic  HENT:      Head: Normocephalic and atraumatic  Nose: Nose normal       Mouth/Throat:      Mouth: Mucous membranes are moist       Pharynx: Oropharynx is clear  No oropharyngeal exudate or posterior oropharyngeal erythema  Eyes:      General: No scleral icterus  Right eye: No discharge  Left eye: No discharge  Conjunctiva/sclera: Conjunctivae normal    Cardiovascular:      Rate and Rhythm: Normal rate and regular rhythm  Pulses: Normal pulses  Heart sounds: Normal heart sounds  No murmur heard  No friction rub  No gallop  Pulmonary:      Effort: Pulmonary effort is normal  No respiratory distress  Breath sounds: Normal breath sounds  No stridor  No wheezing, rhonchi or rales     Abdominal:      General: Bowel sounds are normal  There is no distension  Palpations: Abdomen is soft  Tenderness: There is no abdominal tenderness  There is no right CVA tenderness, left CVA tenderness, guarding or rebound  Musculoskeletal:         General: No swelling, tenderness, deformity or signs of injury  Normal range of motion  Cervical back: Normal range of motion and neck supple  No rigidity  Right lower leg: No edema  Left lower leg: No edema  Comments: No calf tenderness or unilateral leg swelling  Skin:     General: Skin is warm and dry  Coloration: Skin is not jaundiced  Findings: No rash  Neurological:      General: No focal deficit present  Mental Status: She is alert and oriented to person, place, and time  Sensory: No sensory deficit  Motor: Motor function is intact        Coordination: Coordination normal    Psychiatric:         Mood and Affect: Mood normal          Behavior: Behavior normal          Vital Signs  ED Triage Vitals   Temperature Pulse Respirations Blood Pressure SpO2   09/26/22 1401 09/26/22 1401 09/26/22 1403 09/26/22 1407 09/26/22 1401   98 2 °F (36 8 °C) 95 18 105/68 100 %      Temp Source Heart Rate Source Patient Position - Orthostatic VS BP Location FiO2 (%)   09/26/22 1401 09/26/22 1401 09/26/22 1407 09/26/22 1407 --   Oral Monitor Sitting Right arm       Pain Score       09/26/22 1401       8           Vitals:    09/26/22 1401 09/26/22 1407   BP:  105/68   Pulse: 95    Patient Position - Orthostatic VS:  Sitting         Visual Acuity      ED Medications  Medications - No data to display    Diagnostic Studies  Results Reviewed     Procedure Component Value Units Date/Time    COVID only [[id number]] Collected: 09/26/22 1426    Lab Status: No result Specimen: Nares from Nose                  No orders to display              Procedures  Procedures         ED Course         CRAFFT    Flowsheet Row Most Recent Value   SBIRT (13-21 yo)    In order to provide better care to our patients, we are screening all of our patients for alcohol and drug use  Would it be okay to ask you these screening questions? No Filed at: 09/26/2022 1418                                          [facility]  Number of Diagnoses or Management Options  URI (upper respiratory infection)  Diagnosis management comments: Most likely viral syndrome  Will check for COVID  No fever, lymphadenopathy, or pharyngeal erythema to suggest strep throat  Lungs are clear  Doubt pneumonia  Neck is supple  Doubt meningitis  Appropriate for discharge and outpatient management  Amount and/or Complexity of Data Reviewed  Clinical lab tests: ordered        Disposition  Final diagnoses:   URI (upper respiratory infection)     Time reflects when diagnosis was documented in both MDM as applicable and the Disposition within this note     Time User Action Codes Description Comment    9/26/2022  2:21 PM Albert Cheng Add [J06 9] URI (upper respiratory infection)       ED Disposition     ED Disposition   Discharge    Condition   Stable    Date/Time   Mon Sep 26, 2022  2:21 PM    Comment   Devendra South discharge to home/self care  Follow-up Information     Follow up With Specialties Details Why Contact Info Additional Information    [facility]  As needed [address]  [place] [address]  [address] [facility], [address] [place] [address]          Patient's Medications   Discharge Prescriptions    No medications on file       No discharge procedures on file      PDMP Review     None          ED Provider  Electronically Signed by           Darrick Callaway MD  09/26/22 0432

## 2024-02-25 ENCOUNTER — HOSPITAL ENCOUNTER (EMERGENCY)
Facility: HOSPITAL | Age: 24
Discharge: HOME | End: 2024-02-25
Attending: EMERGENCY MEDICINE
Payer: MEDICAID

## 2024-02-25 VITALS
DIASTOLIC BLOOD PRESSURE: 75 MMHG | BODY MASS INDEX: 28.15 KG/M2 | HEART RATE: 95 BPM | WEIGHT: 149 LBS | TEMPERATURE: 98.6 F | RESPIRATION RATE: 16 BRPM | OXYGEN SATURATION: 99 % | SYSTOLIC BLOOD PRESSURE: 121 MMHG

## 2024-02-25 DIAGNOSIS — H10.9 CONJUNCTIVITIS: Primary | ICD-10-CM

## 2024-02-25 PROCEDURE — 99282 EMERGENCY DEPT VISIT SF MDM: CPT

## 2024-02-25 PROCEDURE — 99284 EMERGENCY DEPT VISIT MOD MDM: CPT | Performed by: PHYSICIAN ASSISTANT

## 2024-02-25 RX ORDER — POLYMYXIN B SULFATE AND TRIMETHOPRIM 1; 10000 MG/ML; [USP'U]/ML
1 SOLUTION OPHTHALMIC EVERY 4 HOURS
Qty: 10 ML | Refills: 0 | Status: SHIPPED | OUTPATIENT
Start: 2024-02-25

## 2024-02-25 NOTE — ED PROVIDER NOTES
History  Chief Complaint   Patient presents with    Eye Drainage     Works at a . Started yesterday with bilateral eye redness and drainage.      Patient is a 23-year-old white female who reports 1 day history of bilateral eye redness and purulent drainage noted this morning.  She does not wear contacts.  No eye trauma.  Patient works in a  where there have been a couple cases of conjunctivitis recently. No fever. Mild cough for past week        None       Past Medical History:   Diagnosis Date    Anxiety     Depression     Headache(784.0)        Past Surgical History:   Procedure Laterality Date    APPENDECTOMY      TONSILLECTOMY         Family History   Problem Relation Age of Onset    No Known Problems Mother     No Known Problems Father     No Known Problems Brother     Diabetes Paternal Grandmother     Heart disease Paternal Grandfather     No Known Problems Brother      I have reviewed and agree with the history as documented.    E-Cigarette/Vaping    E-Cigarette Use Never User      E-Cigarette/Vaping Substances     Social History     Tobacco Use    Smoking status: Never    Smokeless tobacco: Never   Vaping Use    Vaping status: Never Used   Substance Use Topics    Alcohol use: Not Currently    Drug use: Not Currently       Review of Systems   Constitutional:  Negative for chills and fever.   HENT:  Negative for congestion.    Eyes:  Positive for redness. Negative for photophobia, pain, discharge, itching and visual disturbance.   Respiratory:  Positive for cough.    Cardiovascular:  Negative for chest pain.       Physical Exam  Physical Exam  Vitals and nursing note reviewed.   Constitutional:       General: She is not in acute distress.     Appearance: Normal appearance. She is not ill-appearing, toxic-appearing or diaphoretic.   HENT:      Head: Normocephalic and atraumatic.      Right Ear: Tympanic membrane, ear canal and external ear normal.      Left Ear: Tympanic membrane, ear canal and  external ear normal.      Nose: Nose normal.      Mouth/Throat:      Mouth: Mucous membranes are moist.      Pharynx: Oropharynx is clear.   Eyes:      Extraocular Movements: Extraocular movements intact.      Pupils: Pupils are equal, round, and reactive to light.      Comments: Bilateral injected conjunctiva.   Cardiovascular:      Rate and Rhythm: Normal rate and regular rhythm.      Pulses: Normal pulses.      Heart sounds: Normal heart sounds.   Pulmonary:      Effort: Pulmonary effort is normal.      Breath sounds: Normal breath sounds.   Musculoskeletal:      Cervical back: Normal range of motion and neck supple.   Skin:     General: Skin is warm and dry.      Capillary Refill: Capillary refill takes less than 2 seconds.   Neurological:      Mental Status: She is alert.         Vital Signs  ED Triage Vitals [02/25/24 1054]   Temperature Pulse Respirations Blood Pressure SpO2   98.6 °F (37 °C) 95 16 121/75 99 %      Temp Source Heart Rate Source Patient Position - Orthostatic VS BP Location FiO2 (%)   Tympanic Monitor Sitting Left arm --      Pain Score       --           Vitals:    02/25/24 1054   BP: 121/75   Pulse: 95   Patient Position - Orthostatic VS: Sitting         Visual Acuity      ED Medications  Medications - No data to display    Diagnostic Studies  Results Reviewed       None                   No orders to display              Procedures  Procedures         ED Course                               SBIRT 20yo+      Flowsheet Row Most Recent Value   Initial Alcohol Screen: US AUDIT-C     1. How often do you have a drink containing alcohol? 1 Filed at: 02/25/2024 1057   2. How many drinks containing alcohol do you have on a typical day you are drinking?  1 Filed at: 02/25/2024 1057   3b. FEMALE Any Age, or MALE 65+: How often do you have 4 or more drinks on one occassion? 0 Filed at: 02/25/2024 1057   Audit-C Score 2 Filed at: 02/25/2024 1057   JAJA: How many times in the past year have you...     Used an illegal drug or used a prescription medication for non-medical reasons? Never Filed at: 02/25/2024 1057                      Medical Decision Making  23-year-old white female presenting with bilateral conjunctivitis.  Will treat with Polytrim ophthalmic solution eyedrops.  Advised follow-up with eye doctor Dr. Fisher if no improvement next 24 to 48 hours.  Return precautions reviewed.    Risk  Prescription drug management.             Disposition  Final diagnoses:   Conjunctivitis     Time reflects when diagnosis was documented in both MDM as applicable and the Disposition within this note       Time User Action Codes Description Comment    2/25/2024 11:07 AM Ricardo Stephenson Add [H10.9] Conjunctivitis           ED Disposition       ED Disposition   Discharge    Condition   Stable    Date/Time   Sun Feb 25, 2024 1111    Comment   Tamiko Pozo discharge to home/self care.                   Follow-up Information       Follow up With Specialties Details Why Contact Info    Brad Fisher MD Ophthalmology   [address] [place] [address]  [phone]              Discharge Medication List as of 2/25/2024 11:12 AM        START taking these medications    Details   polymyxin b-trimethoprim (POLYTRIM) ophthalmic solution Administer 1 drop to both eyes every 4 (four) hours, Starting Sun 2/25/2024, Normal             No discharge procedures on file.    PDMP Review       None            ED Provider  Electronically Signed by             Ricardo Stephenson PA-C  02/25/24 7880

## 2024-02-25 NOTE — Clinical Note
Tamiko Pozo was seen and treated in our emergency department on 2/25/2024.                Diagnosis:     Tamiko  .    She may return on this date:     Tamiko Pozo is excused from work Monday February 26th     If you have any questions or concerns, please don't hesitate to call.      Ricardo Stephenson PA-C    ______________________________           _______________          _______________  Hospital Representative                              Date                                Time

## 2024-02-25 NOTE — DISCHARGE INSTRUCTIONS
Polymyxin B-trimethoprim eye solution 1 drop every 4 hours to each eye    Follow up with eye doctor - Dr Fisher- if no improvement next 48 hours    Return to ED for new or worsening symptoms

## 2024-02-28 ENCOUNTER — HOSPITAL ENCOUNTER (EMERGENCY)
Facility: HOSPITAL | Age: 24
Discharge: HOME | End: 2024-02-28
Attending: EMERGENCY MEDICINE
Payer: MEDICAID

## 2024-02-28 VITALS
TEMPERATURE: 98.3 F | RESPIRATION RATE: 18 BRPM | SYSTOLIC BLOOD PRESSURE: 117 MMHG | HEART RATE: 79 BPM | DIASTOLIC BLOOD PRESSURE: 65 MMHG | OXYGEN SATURATION: 97 % | BODY MASS INDEX: 28.53 KG/M2 | WEIGHT: 151 LBS

## 2024-02-28 DIAGNOSIS — T78.40XA ALLERGIC REACTION, INITIAL ENCOUNTER: ICD-10-CM

## 2024-02-28 DIAGNOSIS — L50.9 URTICARIA: Primary | ICD-10-CM

## 2024-02-28 PROCEDURE — 96361 HYDRATE IV INFUSION ADD-ON: CPT

## 2024-02-28 PROCEDURE — 99282 EMERGENCY DEPT VISIT SF MDM: CPT

## 2024-02-28 PROCEDURE — 96375 TX/PRO/DX INJ NEW DRUG ADDON: CPT

## 2024-02-28 PROCEDURE — 99284 EMERGENCY DEPT VISIT MOD MDM: CPT | Performed by: EMERGENCY MEDICINE

## 2024-02-28 PROCEDURE — 96374 THER/PROPH/DIAG INJ IV PUSH: CPT

## 2024-02-28 RX ORDER — PREDNISONE 20 MG/1
20 TABLET ORAL 2 TIMES DAILY
Qty: 20 TABLET | Refills: 0 | Status: SHIPPED | OUTPATIENT
Start: 2024-02-28 | End: 2024-03-09

## 2024-02-28 RX ORDER — METHYLPREDNISOLONE SODIUM SUCCINATE 125 MG/2ML
125 INJECTION, POWDER, LYOPHILIZED, FOR SOLUTION INTRAMUSCULAR; INTRAVENOUS ONCE
Status: COMPLETED | OUTPATIENT
Start: 2024-02-28 | End: 2024-02-28

## 2024-02-28 RX ORDER — SODIUM CHLORIDE 9 MG/ML
150 INJECTION, SOLUTION INTRAVENOUS CONTINUOUS
Status: DISCONTINUED | OUTPATIENT
Start: 2024-02-28 | End: 2024-02-28 | Stop reason: HOSPADM

## 2024-02-28 RX ORDER — FAMOTIDINE 10 MG/ML
20 INJECTION, SOLUTION INTRAVENOUS ONCE
Status: COMPLETED | OUTPATIENT
Start: 2024-02-28 | End: 2024-02-28

## 2024-02-28 RX ORDER — DIPHENHYDRAMINE HYDROCHLORIDE 50 MG/ML
25 INJECTION INTRAMUSCULAR; INTRAVENOUS ONCE
Status: COMPLETED | OUTPATIENT
Start: 2024-02-28 | End: 2024-02-28

## 2024-02-28 RX ADMIN — SODIUM CHLORIDE 150 ML/HR: 0.9 INJECTION, SOLUTION INTRAVENOUS at 09:57

## 2024-02-28 RX ADMIN — METHYLPREDNISOLONE SODIUM SUCCINATE 125 MG: 125 INJECTION, POWDER, FOR SOLUTION INTRAMUSCULAR; INTRAVENOUS at 10:00

## 2024-02-28 RX ADMIN — FAMOTIDINE 20 MG: 10 INJECTION, SOLUTION INTRAVENOUS at 10:03

## 2024-02-28 RX ADMIN — DIPHENHYDRAMINE HYDROCHLORIDE 25 MG: 50 INJECTION, SOLUTION INTRAMUSCULAR; INTRAVENOUS at 09:58

## 2024-02-28 NOTE — Clinical Note
Tamiko Pozo was seen and treated in our emergency department on 2/28/2024.                Diagnosis:     Tamiko  may return to work on return date.    She may return on this date: 03/01/2024         If you have any questions or concerns, please don't hesitate to call.      Jose Manuel Penn MD    ______________________________           _______________          _______________  Hospital Representative                              Date                                Time

## 2024-02-28 NOTE — DISCHARGE INSTRUCTIONS
May take Benadryl and Pepcid AC in addition to the prednisone.    Keep a food log as we discussed and start elimination diet

## 2024-02-28 NOTE — ED PROVIDER NOTES
History  Chief Complaint   Patient presents with    Urticaria     Patient states she woke up with itchy hives all over her body around 0400.  No known allergen.  C/o feeling lightheaded.  Denies SOB.  States started eyedrops for pink eye a couple days ago.     Patient ate her normal supper last night.  She was awakened from sleep around 4 AM today with diffuse itchy rash.  Patient has rash covered and uncovered parts of her body including her back and face and head.  No shortness of breath.  No tightness in the throat.  She is able to speak and breathe normally.  Patient states she has no known or suspected allergen.  No new foods or pills.  She has never had a allergic reaction in the past        Prior to Admission Medications   Prescriptions Last Dose Informant Patient Reported? Taking?   polymyxin b-trimethoprim (POLYTRIM) ophthalmic solution   No Yes   Sig: Administer 1 drop to both eyes every 4 (four) hours      Facility-Administered Medications: None       Past Medical History:   Diagnosis Date    Anxiety     Depression     Headache(784.0)        Past Surgical History:   Procedure Laterality Date    APPENDECTOMY      TONSILLECTOMY         Family History   Problem Relation Age of Onset    No Known Problems Mother     No Known Problems Father     No Known Problems Brother     Diabetes Paternal Grandmother     Heart disease Paternal Grandfather     No Known Problems Brother      I have reviewed and agree with the history as documented.    E-Cigarette/Vaping    E-Cigarette Use Never User      E-Cigarette/Vaping Substances     Social History     Tobacco Use    Smoking status: Never    Smokeless tobacco: Never   Vaping Use    Vaping status: Never Used   Substance Use Topics    Alcohol use: Not Currently    Drug use: Not Currently       Review of Systems   Constitutional:  Negative for chills and fever.   HENT:  Negative for congestion, sore throat, trouble swallowing and voice change.    Eyes:  Negative for visual  disturbance.   Respiratory:  Negative for cough, chest tightness and shortness of breath.    Cardiovascular:  Negative for chest pain and leg swelling.   Gastrointestinal:  Negative for abdominal pain and vomiting.   Genitourinary:  Negative for dysuria.   Musculoskeletal:  Negative for arthralgias and back pain.   Skin:  Positive for rash.   Neurological:  Negative for weakness and headaches.   Hematological:  Does not bruise/bleed easily.   Psychiatric/Behavioral:  Negative for confusion.    All other systems reviewed and are negative.      Physical Exam  Physical Exam  Vitals and nursing note reviewed.   Constitutional:       Appearance: Normal appearance.   HENT:      Head: Normocephalic.      Right Ear: External ear normal.      Left Ear: External ear normal.      Nose: Nose normal.      Mouth/Throat:      Mouth: Mucous membranes are moist.      Pharynx: Oropharynx is clear. No oropharyngeal exudate.   Eyes:      Conjunctiva/sclera: Conjunctivae normal.   Cardiovascular:      Rate and Rhythm: Normal rate and regular rhythm.      Pulses: Normal pulses.   Pulmonary:      Effort: Pulmonary effort is normal.      Breath sounds: Normal breath sounds. No stridor. No wheezing.   Abdominal:      Palpations: Abdomen is soft.      Tenderness: There is no abdominal tenderness.   Musculoskeletal:         General: Normal range of motion.      Cervical back: Normal range of motion.   Skin:     General: Skin is warm and dry.      Capillary Refill: Capillary refill takes less than 2 seconds.      Findings: Rash present.      Comments: Patient has a raised urticarial rash demonstrating confluence   Neurological:      General: No focal deficit present.      Mental Status: She is alert and oriented to person, place, and time.   Psychiatric:         Mood and Affect: Mood normal.         Behavior: Behavior normal.         Vital Signs  ED Triage Vitals [02/28/24 0913]   Temperature Pulse Respirations Blood Pressure SpO2   98.3 °F  (36.8 °C) 86 19 117/69 97 %      Temp Source Heart Rate Source Patient Position - Orthostatic VS BP Location FiO2 (%)   Tympanic Monitor Sitting Right arm --      Pain Score       No Pain           Vitals:    02/28/24 1050 02/28/24 1105 02/28/24 1128 02/28/24 1130   BP:  110/70 113/76 111/77   Pulse: 78 78 85 88   Patient Position - Orthostatic VS:   Sitting          Visual Acuity      ED Medications  Medications   sodium chloride 0.9 % infusion (150 mL/hr Intravenous New Bag 2/28/24 0957)   methylPREDNISolone sodium succinate (Solu-MEDROL) injection 125 mg (125 mg Intravenous Given 2/28/24 1000)   diphenhydrAMINE (BENADRYL) injection 25 mg (25 mg Intravenous Given 2/28/24 0958)   Famotidine (PF) (PEPCID) injection 20 mg (20 mg Intravenous Given 2/28/24 1003)       Diagnostic Studies  Results Reviewed       None                   No orders to display              Procedures  Procedures         ED Course                                             Medical Decision Making  Patient appears to be having cell-mediated allergic reaction in the form of urticaria.  She has no known allergen.  Will treat with steroids and antihistamines.  Told her to make a food log to start elimination    Risk  Prescription drug management.             Disposition  Final diagnoses:   Urticaria   Allergic reaction, initial encounter     Time reflects when diagnosis was documented in both MDM as applicable and the Disposition within this note       Time User Action Codes Description Comment    2/28/2024 11:32 AM Jose Manuel Penn Add [L50.9] Urticaria     2/28/2024 11:32 AM Jose Manuel Penn [T78.40XA] Allergic reaction, initial encounter           ED Disposition       ED Disposition   Discharge    Condition   Stable    Date/Time   Wed Feb 28, 2024 11:32 AM    Comment   Tamiko Pozo discharge to home/self care.                   Follow-up Information       Follow up With Specialties Details Why Contact Info Additional Information     [facility] Schedule an appointment as soon as possible for a visit   [address] [facility] [address]  [facility] [id number] [phone] [facility], [address] [place] [address] [address], [place], [place], [id number] [phone]            Patient's Medications   Discharge Prescriptions    PREDNISONE 20 MG TABLET    Take 1 tablet (20 mg total) by mouth 2 (two) times a day for 10 days       Start Date: 2/28/2024 End Date: 3/9/2024       Order Dose: 20 mg       Quantity: 20 tablet    Refills: 0       No discharge procedures on file.    PDMP Review       None            ED Provider  Electronically Signed by             Jose Manuel Penn MD  02/28/24 8237

## 2024-02-29 ENCOUNTER — HOSPITAL ENCOUNTER (EMERGENCY)
Facility: HOSPITAL | Age: 24
Discharge: HOME | End: 2024-03-01
Attending: EMERGENCY MEDICINE | Admitting: EMERGENCY MEDICINE
Payer: MEDICAID

## 2024-02-29 DIAGNOSIS — L50.9 URTICARIA: Primary | ICD-10-CM

## 2024-02-29 PROCEDURE — 99282 EMERGENCY DEPT VISIT SF MDM: CPT

## 2024-02-29 PROCEDURE — 96374 THER/PROPH/DIAG INJ IV PUSH: CPT

## 2024-02-29 PROCEDURE — 99284 EMERGENCY DEPT VISIT MOD MDM: CPT | Performed by: EMERGENCY MEDICINE

## 2024-02-29 PROCEDURE — 96361 HYDRATE IV INFUSION ADD-ON: CPT

## 2024-02-29 PROCEDURE — 96372 THER/PROPH/DIAG INJ SC/IM: CPT

## 2024-02-29 PROCEDURE — 96375 TX/PRO/DX INJ NEW DRUG ADDON: CPT

## 2024-02-29 RX ORDER — EPINEPHRINE 1 MG/ML
0.3 INJECTION, SOLUTION, CONCENTRATE INTRAVENOUS ONCE
Status: COMPLETED | OUTPATIENT
Start: 2024-02-29 | End: 2024-02-29

## 2024-02-29 RX ORDER — FAMOTIDINE 10 MG/ML
20 INJECTION, SOLUTION INTRAVENOUS ONCE
Status: COMPLETED | OUTPATIENT
Start: 2024-02-29 | End: 2024-02-29

## 2024-02-29 RX ORDER — DIPHENHYDRAMINE HYDROCHLORIDE 50 MG/ML
50 INJECTION INTRAMUSCULAR; INTRAVENOUS ONCE
Status: COMPLETED | OUTPATIENT
Start: 2024-02-29 | End: 2024-02-29

## 2024-02-29 RX ADMIN — SODIUM CHLORIDE 1000 ML: 0.9 INJECTION, SOLUTION INTRAVENOUS at 23:28

## 2024-02-29 RX ADMIN — DIPHENHYDRAMINE HYDROCHLORIDE 50 MG: 50 INJECTION, SOLUTION INTRAMUSCULAR; INTRAVENOUS at 23:29

## 2024-02-29 RX ADMIN — EPINEPHRINE 0.3 MG: 1 INJECTION, SOLUTION, CONCENTRATE INTRAVENOUS at 23:24

## 2024-02-29 RX ADMIN — FAMOTIDINE 20 MG: 10 INJECTION, SOLUTION INTRAVENOUS at 23:31

## 2024-02-29 NOTE — Clinical Note
Tamiko Pozo was seen and treated in our emergency department on 2/29/2024.    No restrictions            Diagnosis:     Tamiko  is off the rest of the shift today, may return to work on return date.    She may return on this date: 03/04/2024         If you have any questions or concerns, please don't hesitate to call.      Martin Heller MD    ______________________________           _______________          _______________  Hospital Representative                              Date                                Time

## 2024-03-01 VITALS
HEART RATE: 97 BPM | RESPIRATION RATE: 18 BRPM | OXYGEN SATURATION: 98 % | TEMPERATURE: 97.3 F | SYSTOLIC BLOOD PRESSURE: 116 MMHG | WEIGHT: 153 LBS | HEIGHT: 61 IN | DIASTOLIC BLOOD PRESSURE: 59 MMHG | BODY MASS INDEX: 28.89 KG/M2

## 2024-03-01 PROCEDURE — 96375 TX/PRO/DX INJ NEW DRUG ADDON: CPT

## 2024-03-01 RX ORDER — TRIAMCINOLONE ACETONIDE 1 MG/G
OINTMENT TOPICAL 2 TIMES DAILY
Qty: 80 G | Refills: 0 | Status: SHIPPED | OUTPATIENT
Start: 2024-03-01

## 2024-03-01 RX ORDER — METHYLPREDNISOLONE SODIUM SUCCINATE 40 MG/ML
40 INJECTION, POWDER, LYOPHILIZED, FOR SOLUTION INTRAMUSCULAR; INTRAVENOUS ONCE
Status: COMPLETED | OUTPATIENT
Start: 2024-03-01 | End: 2024-03-01

## 2024-03-01 RX ORDER — FAMOTIDINE 10 MG/ML
20 INJECTION, SOLUTION INTRAVENOUS ONCE
Status: DISCONTINUED | OUTPATIENT
Start: 2024-03-01 | End: 2024-03-01

## 2024-03-01 RX ORDER — LORAZEPAM 1 MG/1
1 TABLET ORAL ONCE
Status: COMPLETED | OUTPATIENT
Start: 2024-03-01 | End: 2024-03-01

## 2024-03-01 RX ORDER — HYDROXYZINE HYDROCHLORIDE 25 MG/1
25 TABLET, FILM COATED ORAL EVERY 6 HOURS
Qty: 12 TABLET | Refills: 0 | Status: SHIPPED | OUTPATIENT
Start: 2024-03-01

## 2024-03-01 RX ADMIN — LORAZEPAM 1 MG: 1 TABLET ORAL at 02:16

## 2024-03-01 RX ADMIN — METHYLPREDNISOLONE SODIUM SUCCINATE 40 MG: 40 INJECTION, POWDER, FOR SOLUTION INTRAMUSCULAR; INTRAVENOUS at 00:19

## 2024-03-01 NOTE — ED PROVIDER NOTES
History  Chief Complaint   Patient presents with    Allergic Reaction     Pt reports she was seen yesterday for possible allergic reaction. Pt states since discharge her symptoms have worsened and hives have spread over entire body. Pt reports itchiness and throat feels like it might be closing. Pt reports SOB. Pt has unknown origin of reaction.      Patient seen in the ER yesterday for allergic reaction.  She had developed hives to an unknown source.  No history of prior allergic reactions.  She was given Benadryl and prednisone.  States that night the hives got worse and she feels a funny feeling in her throat and having difficulty swallowing and feels short of breath.  Patient did take the prednisone today last dose of Benadryl was around 6 PM.      History provided by:  Patient   used: No    Allergic Reaction  Presenting symptoms: rash        Prior to Admission Medications   Prescriptions Last Dose Informant Patient Reported? Taking?   polymyxin b-trimethoprim (POLYTRIM) ophthalmic solution   No No   Sig: Administer 1 drop to both eyes every 4 (four) hours   predniSONE 20 mg tablet   No No   Sig: Take 1 tablet (20 mg total) by mouth 2 (two) times a day for 10 days      Facility-Administered Medications: None       Past Medical History:   Diagnosis Date    Anxiety     Depression     Headache(784.0)        Past Surgical History:   Procedure Laterality Date    APPENDECTOMY      TONSILLECTOMY         Family History   Problem Relation Age of Onset    No Known Problems Mother     No Known Problems Father     No Known Problems Brother     Diabetes Paternal Grandmother     Heart disease Paternal Grandfather     No Known Problems Brother      I have reviewed and agree with the history as documented.    E-Cigarette/Vaping    E-Cigarette Use Never User      E-Cigarette/Vaping Substances     Social History     Tobacco Use    Smoking status: Never    Smokeless tobacco: Never   Vaping Use    Vaping status:  Never Used   Substance Use Topics    Alcohol use: Not Currently    Drug use: Not Currently       Review of Systems   Respiratory:  Positive for shortness of breath.    Skin:  Positive for rash.   All other systems reviewed and are negative.      Physical Exam  Physical Exam  Vitals and nursing note reviewed.   Constitutional:       General: She is not in acute distress.  HENT:      Mouth/Throat:      Mouth: Mucous membranes are moist.      Pharynx: Oropharynx is clear. Uvula midline. No pharyngeal swelling, oropharyngeal exudate, posterior oropharyngeal erythema or uvula swelling.   Eyes:      General: No scleral icterus.     Conjunctiva/sclera: Conjunctivae normal.   Cardiovascular:      Rate and Rhythm: Normal rate and regular rhythm.   Pulmonary:      Effort: Pulmonary effort is normal. No respiratory distress.      Breath sounds: Normal breath sounds. No stridor. No wheezing.   Skin:     Capillary Refill: Capillary refill takes less than 2 seconds.      Findings: Rash present.      Comments: Diffuse urticarial rash   Neurological:      General: No focal deficit present.      Mental Status: She is alert and oriented to person, place, and time.         Vital Signs  ED Triage Vitals [02/29/24 2308]   Temperature Pulse Respirations Blood Pressure SpO2   (!) 97.3 °F (36.3 °C) 98 18 136/60 99 %      Temp Source Heart Rate Source Patient Position - Orthostatic VS BP Location FiO2 (%)   Oral Monitor Sitting Right arm --      Pain Score       --           Vitals:    02/29/24 2308   BP: 136/60   Pulse: 98   Patient Position - Orthostatic VS: Sitting         Visual Acuity      ED Medications  Medications   sodium chloride 0.9 % bolus 1,000 mL (1,000 mL Intravenous New Bag 2/29/24 2328)   Famotidine (PF) (PEPCID) injection 20 mg (20 mg Intravenous Given 2/29/24 2331)   diphenhydrAMINE (BENADRYL) injection 50 mg (50 mg Intravenous Given 2/29/24 2329)   EPINEPHrine PF (ADRENALIN) 1 mg/mL injection 0.3 mg (0.3 mg Intramuscular  Given 2/29/24 2324)       Diagnostic Studies  Results Reviewed       None                   No orders to display              Procedures  Procedures         ED Course                               SBIRT 20yo+      Flowsheet Row Most Recent Value   Initial Alcohol Screen: US AUDIT-C     1. How often do you have a drink containing alcohol? 1 Filed at: 02/29/2024 2311   2. How many drinks containing alcohol do you have on a typical day you are drinking?  0 Filed at: 02/29/2024 2311   3a. Male UNDER 65: How often do you have five or more drinks on one occasion? 0 Filed at: 02/29/2024 2311   3b. FEMALE Any Age, or MALE 65+: How often do you have 4 or more drinks on one occassion? 0 Filed at: 02/29/2024 2311   Audit-C Score 1 Filed at: 02/29/2024 2311   JAJA: How many times in the past year have you...    Used an illegal drug or used a prescription medication for non-medical reasons? Never Filed at: 02/29/2024 2311                      Medical Decision Making  Pulse ox 99% on room air indicating adequate oxygenation.      Signed out to next provider, Dr. Heller.    Risk  Prescription drug management.             Disposition  Final diagnoses:   None     ED Disposition       None          Follow-up Information    None         Patient's Medications   Discharge Prescriptions    No medications on file       No discharge procedures on file.    PDMP Review       None            ED Provider  Electronically Signed by             Ricardo Alfredo DO  03/01/24 0048

## 2025-01-15 ENCOUNTER — APPOINTMENT (EMERGENCY)
Dept: RADIOLOGY | Facility: HOSPITAL | Age: 25
End: 2025-01-15
Payer: COMMERCIAL

## 2025-01-15 ENCOUNTER — HOSPITAL ENCOUNTER (EMERGENCY)
Facility: HOSPITAL | Age: 25
Discharge: HOME | End: 2025-01-15
Attending: EMERGENCY MEDICINE
Payer: COMMERCIAL

## 2025-01-15 VITALS
TEMPERATURE: 97.5 F | DIASTOLIC BLOOD PRESSURE: 73 MMHG | RESPIRATION RATE: 15 BRPM | HEART RATE: 98 BPM | OXYGEN SATURATION: 98 % | SYSTOLIC BLOOD PRESSURE: 113 MMHG

## 2025-01-15 DIAGNOSIS — R11.0 NAUSEA: ICD-10-CM

## 2025-01-15 DIAGNOSIS — T83.32XA MALPOSITIONED INTRAUTERINE DEVICE (IUD), INITIAL ENCOUNTER: ICD-10-CM

## 2025-01-15 DIAGNOSIS — R19.7 DIARRHEA: ICD-10-CM

## 2025-01-15 DIAGNOSIS — R11.10 VOMITING: ICD-10-CM

## 2025-01-15 DIAGNOSIS — R10.9 ABDOMINAL PAIN: Primary | ICD-10-CM

## 2025-01-15 LAB
ALBUMIN SERPL BCG-MCNC: 4.7 G/DL (ref 3.5–5)
ALP SERPL-CCNC: 70 U/L (ref 34–104)
ALT SERPL W P-5'-P-CCNC: 15 U/L (ref 7–52)
ANION GAP SERPL CALCULATED.3IONS-SCNC: 10 MMOL/L (ref 4–13)
AST SERPL W P-5'-P-CCNC: 16 U/L (ref 13–39)
B-HCG SERPL-ACNC: <0.6 MIU/ML (ref 0–5)
BACTERIA UR QL AUTO: NORMAL /HPF
BASOPHILS # BLD AUTO: 0.04 THOUSANDS/ΜL (ref 0–0.1)
BASOPHILS NFR BLD AUTO: 0 % (ref 0–1)
BILIRUB SERPL-MCNC: 0.62 MG/DL (ref 0.2–1)
BILIRUB UR QL STRIP: NEGATIVE
BUN SERPL-MCNC: 10 MG/DL (ref 5–25)
CALCIUM SERPL-MCNC: 9.2 MG/DL (ref 8.4–10.2)
CHLORIDE SERPL-SCNC: 103 MMOL/L (ref 96–108)
CLARITY UR: CLEAR
CO2 SERPL-SCNC: 27 MMOL/L (ref 21–32)
COLOR UR: ABNORMAL
CREAT SERPL-MCNC: 0.59 MG/DL (ref 0.6–1.3)
EOSINOPHIL # BLD AUTO: 0.15 THOUSAND/ΜL (ref 0–0.61)
EOSINOPHIL NFR BLD AUTO: 1 % (ref 0–6)
ERYTHROCYTE [DISTWIDTH] IN BLOOD BY AUTOMATED COUNT: 11.9 % (ref 11.6–15.1)
GFR SERPL CREATININE-BSD FRML MDRD: 130 ML/MIN/1.73SQ M
GLUCOSE SERPL-MCNC: 110 MG/DL (ref 65–140)
GLUCOSE UR STRIP-MCNC: NEGATIVE MG/DL
HCT VFR BLD AUTO: 41 % (ref 34.8–46.1)
HGB BLD-MCNC: 13.7 G/DL (ref 11.5–15.4)
HGB UR QL STRIP.AUTO: ABNORMAL
IMM GRANULOCYTES # BLD AUTO: 0.02 THOUSAND/UL (ref 0–0.2)
IMM GRANULOCYTES NFR BLD AUTO: 0 % (ref 0–2)
KETONES UR STRIP-MCNC: NEGATIVE MG/DL
LEUKOCYTE ESTERASE UR QL STRIP: NEGATIVE
LIPASE SERPL-CCNC: 11 U/L (ref 11–82)
LYMPHOCYTES # BLD AUTO: 1.61 THOUSANDS/ΜL (ref 0.6–4.47)
LYMPHOCYTES NFR BLD AUTO: 12 % (ref 14–44)
MAGNESIUM SERPL-MCNC: 2 MG/DL (ref 1.9–2.7)
MCH RBC QN AUTO: 29.5 PG (ref 26.8–34.3)
MCHC RBC AUTO-ENTMCNC: 33.4 G/DL (ref 31.4–37.4)
MCV RBC AUTO: 88 FL (ref 82–98)
MONOCYTES # BLD AUTO: 0.67 THOUSAND/ΜL (ref 0.17–1.22)
MONOCYTES NFR BLD AUTO: 5 % (ref 4–12)
NEUTROPHILS # BLD AUTO: 10.65 THOUSANDS/ΜL (ref 1.85–7.62)
NEUTS SEG NFR BLD AUTO: 82 % (ref 43–75)
NITRITE UR QL STRIP: NEGATIVE
NON-SQ EPI CELLS URNS QL MICRO: NORMAL /HPF
NRBC BLD AUTO-RTO: 0 /100 WBCS
PH UR STRIP.AUTO: 7.5 [PH]
PLATELET # BLD AUTO: 386 THOUSANDS/UL (ref 149–390)
PMV BLD AUTO: 8.7 FL (ref 8.9–12.7)
POTASSIUM SERPL-SCNC: 3.9 MMOL/L (ref 3.5–5.3)
PROT SERPL-MCNC: 8.5 G/DL (ref 6.4–8.4)
PROT UR STRIP-MCNC: ABNORMAL MG/DL
RBC # BLD AUTO: 4.65 MILLION/UL (ref 3.81–5.12)
RBC #/AREA URNS AUTO: NORMAL /HPF
SODIUM SERPL-SCNC: 140 MMOL/L (ref 135–147)
SP GR UR STRIP.AUTO: 1.01 (ref 1–1.03)
UROBILINOGEN UR STRIP-ACNC: <2 MG/DL
WBC # BLD AUTO: 13.14 THOUSAND/UL (ref 4.31–10.16)
WBC #/AREA URNS AUTO: NORMAL /HPF

## 2025-01-15 PROCEDURE — 80053 COMPREHEN METABOLIC PANEL: CPT | Performed by: EMERGENCY MEDICINE

## 2025-01-15 PROCEDURE — 99284 EMERGENCY DEPT VISIT MOD MDM: CPT | Performed by: EMERGENCY MEDICINE

## 2025-01-15 PROCEDURE — 83735 ASSAY OF MAGNESIUM: CPT | Performed by: EMERGENCY MEDICINE

## 2025-01-15 PROCEDURE — 83690 ASSAY OF LIPASE: CPT | Performed by: EMERGENCY MEDICINE

## 2025-01-15 PROCEDURE — 96375 TX/PRO/DX INJ NEW DRUG ADDON: CPT

## 2025-01-15 PROCEDURE — 81001 URINALYSIS AUTO W/SCOPE: CPT | Performed by: EMERGENCY MEDICINE

## 2025-01-15 PROCEDURE — 74177 CT ABD & PELVIS W/CONTRAST: CPT

## 2025-01-15 PROCEDURE — 96361 HYDRATE IV INFUSION ADD-ON: CPT

## 2025-01-15 PROCEDURE — 85025 COMPLETE CBC W/AUTO DIFF WBC: CPT | Performed by: EMERGENCY MEDICINE

## 2025-01-15 PROCEDURE — 99284 EMERGENCY DEPT VISIT MOD MDM: CPT

## 2025-01-15 PROCEDURE — 36415 COLL VENOUS BLD VENIPUNCTURE: CPT | Performed by: EMERGENCY MEDICINE

## 2025-01-15 PROCEDURE — 84702 CHORIONIC GONADOTROPIN TEST: CPT | Performed by: EMERGENCY MEDICINE

## 2025-01-15 PROCEDURE — 96374 THER/PROPH/DIAG INJ IV PUSH: CPT

## 2025-01-15 RX ORDER — ONDANSETRON 4 MG/1
4 TABLET, ORALLY DISINTEGRATING ORAL EVERY 6 HOURS PRN
Qty: 15 TABLET | Refills: 0 | Status: SHIPPED | OUTPATIENT
Start: 2025-01-15

## 2025-01-15 RX ORDER — ONDANSETRON 2 MG/ML
4 INJECTION INTRAMUSCULAR; INTRAVENOUS ONCE
Status: COMPLETED | OUTPATIENT
Start: 2025-01-15 | End: 2025-01-15

## 2025-01-15 RX ORDER — PANTOPRAZOLE SODIUM 40 MG/10ML
40 INJECTION, POWDER, LYOPHILIZED, FOR SOLUTION INTRAVENOUS ONCE
Status: COMPLETED | OUTPATIENT
Start: 2025-01-15 | End: 2025-01-15

## 2025-01-15 RX ADMIN — IOHEXOL 100 ML: 350 INJECTION, SOLUTION INTRAVENOUS at 02:50

## 2025-01-15 RX ADMIN — ONDANSETRON 4 MG: 2 INJECTION INTRAMUSCULAR; INTRAVENOUS at 00:58

## 2025-01-15 RX ADMIN — PANTOPRAZOLE SODIUM 40 MG: 40 INJECTION, POWDER, FOR SOLUTION INTRAVENOUS at 00:55

## 2025-01-15 RX ADMIN — SODIUM CHLORIDE 1000 ML: 0.9 INJECTION, SOLUTION INTRAVENOUS at 00:55

## 2025-01-15 NOTE — DISCHARGE INSTRUCTIONS
I have sent Zofran to the pharmacy which you can take 1 tablet every 6 hours as needed for nausea or vomiting.  Please wait about 15 to 20 minutes before you try to eat or drink anything.  As we also discussed your IUD is low-lying in your uterus and so may need to be repositioned by your OB.  Please practice caution as it may not give you the full birth control you desire in this position. Please call their office to make an appoint for follow-up.    Return to ER if you develop fever.

## 2025-01-15 NOTE — ED PROVIDER NOTES
Time reflects when diagnosis was documented in both MDM as applicable and the Disposition within this note       Time User Action Codes Description Comment    1/15/2025  1:13 AM Anepu, Cynthia Add [R10.9] Abdominal pain     1/15/2025  1:13 AM Anepu, Cynthia Add [R11.0] Nausea     1/15/2025  1:13 AM Anepu, Cynthia Add [R11.10] Vomiting     1/15/2025  1:13 AM Anepu, Cynthia Add [R19.7] Diarrhea           ED Disposition       None          Assessment & Plan       Medical Decision Making  Patient evaluated with labs imaging urinalysis CT scan pending care transitioned to Dr. Llanos    Problems Addressed:  Abdominal pain: acute illness or injury  Diarrhea: acute illness or injury  Nausea: acute illness or injury  Vomiting: acute illness or injury    Amount and/or Complexity of Data Reviewed  External Data Reviewed: notes.  Labs: ordered. Decision-making details documented in ED Course.  Radiology: ordered. Decision-making details documented in ED Course.    Risk  Prescription drug management.             Medications   sodium chloride 0.9 % bolus 1,000 mL (1,000 mL Intravenous New Bag 1/15/25 0055)   pantoprazole (PROTONIX) injection 40 mg (40 mg Intravenous Given 1/15/25 0055)   ondansetron (ZOFRAN) injection 4 mg (4 mg Intravenous Given 1/15/25 0058)       ED Risk Strat Scores                          SBIRT 20yo+      Flowsheet Row Most Recent Value   Initial Alcohol Screen: US AUDIT-C     1. How often do you have a drink containing alcohol? 1 Filed at: 01/15/2025 0042   2. How many drinks containing alcohol do you have on a typical day you are drinking?  0 Filed at: 01/15/2025 0042   3b. FEMALE Any Age, or MALE 65+: How often do you have 4 or more drinks on one occassion? 0 Filed at: 01/15/2025 0042   Audit-C Score 1 Filed at: 01/15/2025 0042   JAJA: How many times in the past year have you...    Used an illegal drug or used a prescription medication for non-medical reasons? Never Filed at: 01/15/2025 0042                             History of Present Illness       Chief Complaint   Patient presents with    Abdominal Pain     Patient c/o abdominal pain with 5 episodes of vomiting in the past hour, did have chili 4-5 hours ago, co workers kids were sick with stomach virus       Past Medical History:   Diagnosis Date    Anxiety     Depression     Headache(784.0)       Past Surgical History:   Procedure Laterality Date    APPENDECTOMY      TONSILLECTOMY        Family History   Problem Relation Age of Onset    No Known Problems Mother     No Known Problems Father     No Known Problems Brother     Diabetes Paternal Grandmother     Heart disease Paternal Grandfather     No Known Problems Brother       Social History     Tobacco Use    Smoking status: Never    Smokeless tobacco: Never   Vaping Use    Vaping status: Never Used   Substance Use Topics    Alcohol use: Yes     Comment: rare    Drug use: Not Currently      E-Cigarette/Vaping    E-Cigarette Use Never User       E-Cigarette/Vaping Substances      I have reviewed and agree with the history as documented.     24-year-old female presents with generalized abdominal pain sharp continuous currently 7 out of 10 nothing makes it better or worse associated nausea and vomiting and nonbloody diarrhea sick contacts noted.  No abdominal surgical history noted currently on her menstrual cycle denies any dysuria frequency abnormal vaginal bleeding dyspareunia or any other symptoms      History provided by:  Patient   used: No        Review of Systems   Constitutional: Negative.    HENT: Negative.     Eyes: Negative.    Respiratory: Negative.     Cardiovascular: Negative.    Gastrointestinal:  Positive for abdominal pain, diarrhea, nausea and vomiting.   Endocrine: Negative.    Genitourinary: Negative.    Musculoskeletal: Negative.    Skin: Negative.    Allergic/Immunologic: Negative.    Neurological: Negative.    Hematological: Negative.    Psychiatric/Behavioral:  Negative.     All other systems reviewed and are negative.          Objective       ED Triage Vitals [01/15/25 0039]   Temperature Pulse Blood Pressure Respirations SpO2 Patient Position - Orthostatic VS   97.5 °F (36.4 °C) 95 105/67 18 99 % --      Temp Source Heart Rate Source BP Location FiO2 (%) Pain Score    Oral Monitor -- -- 10 - Worst Possible Pain      Vitals      Date and Time Temp Pulse SpO2 Resp BP Pain Score FACES Pain Rating User   01/15/25 0100 -- 94 96 % -- 104/63 -- -- AM   01/15/25 0039 97.5 °F (36.4 °C) 95 99 % 18 105/67 10 - Worst Possible Pain -- KD            Physical Exam  Vitals and nursing note reviewed.   Constitutional:       Appearance: Normal appearance.   HENT:      Head: Normocephalic and atraumatic.      Nose: Nose normal.      Mouth/Throat:      Mouth: Mucous membranes are moist.   Eyes:      Extraocular Movements: Extraocular movements intact.      Pupils: Pupils are equal, round, and reactive to light.   Cardiovascular:      Rate and Rhythm: Normal rate and regular rhythm.   Pulmonary:      Effort: Pulmonary effort is normal.      Breath sounds: Normal breath sounds.   Abdominal:      General: Abdomen is flat. Bowel sounds are normal.      Palpations: Abdomen is soft.      Tenderness: There is generalized abdominal tenderness. There is no right CVA tenderness, left CVA tenderness, guarding or rebound. Negative signs include Garcia's sign, Rovsing's sign, McBurney's sign, psoas sign and obturator sign.   Musculoskeletal:         General: Normal range of motion.      Cervical back: Normal range of motion and neck supple.   Skin:     General: Skin is warm.      Capillary Refill: Capillary refill takes less than 2 seconds.   Neurological:      General: No focal deficit present.      Mental Status: She is alert and oriented to person, place, and time. Mental status is at baseline.   Psychiatric:         Mood and Affect: Mood normal.         Thought Content: Thought content normal.          Results Reviewed       Procedure Component Value Units Date/Time    CBC and differential [[id number]]  (Abnormal) Collected: 01/15/25 0052    Lab Status: Final result Specimen: Blood from Arm, Left Updated: 01/15/25 0104     WBC 13.14 Thousand/uL      RBC 4.65 Million/uL      Hemoglobin 13.7 g/dL      Hematocrit 41.0 %      MCV 88 fL      MCH 29.5 pg      MCHC 33.4 g/dL      RDW 11.9 %      MPV 8.7 fL      Platelets 386 Thousands/uL      nRBC 0 /100 WBCs      Segmented % 82 %      Immature Grans % 0 %      Lymphocytes % 12 %      Monocytes % 5 %      Eosinophils Relative 1 %      Basophils Relative 0 %      Absolute Neutrophils 10.65 Thousands/µL      Absolute Immature Grans 0.02 Thousand/uL      Absolute Lymphocytes 1.61 Thousands/µL      Absolute Monocytes 0.67 Thousand/µL      Eosinophils Absolute 0.15 Thousand/µL      Basophils Absolute 0.04 Thousands/µL     hCG, quantitative [[id number]] Collected: 01/15/25 0052    Lab Status: In process Specimen: Blood from Arm, Left Updated: 01/15/25 0102    Comprehensive metabolic panel [[id number]] Collected: 01/15/25 0052    Lab Status: In process Specimen: Blood from Arm, Left Updated: 01/15/25 0101    Magnesium [[id number]] Collected: 01/15/25 0052    Lab Status: In process Specimen: Blood from Arm, Left Updated: 01/15/25 0101    Lipase [[id number]] Collected: 01/15/25 0052    Lab Status: In process Specimen: Blood from Arm, Left Updated: 01/15/25 0101    UA (URINE) with reflex to Scope [[id number]]     Lab Status: No result Specimen: Urine     POCT pregnancy, urine [[id number]]     Lab Status: No result             CT abdomen pelvis with contrast    (Results Pending)       Procedures    ED Medication and Procedure Management   None     Patient's Medications   Discharge Prescriptions    No medications on file     No discharge procedures on file.  ED SEPSIS DOCUMENTATION   Time reflects when diagnosis was documented in both MDM as applicable and the Disposition within  this note       Time User Action Codes Description Comment    1/15/2025  1:13 AM Anepu, Cynthia Add [R10.9] Abdominal pain     1/15/2025  1:13 AM Anepu, Cynthia Add [R11.0] Nausea     1/15/2025  1:13 AM Anepu, Cynthia Add [R11.10] Vomiting     1/15/2025  1:13 AM Anepu, Cynthia Add [R19.7] Diarrhea                  Cynthia Anepu, DO  01/15/25 0114

## 2025-01-15 NOTE — Clinical Note
Tamiko Pozo was seen and treated in our emergency department on 1/15/2025.    No restrictions            Diagnosis:     Tamiko  may return to work on return date.    She may return on this date: 01/16/2025         If you have any questions or concerns, please don't hesitate to call.      Teddy Olivier RN    ______________________________           _______________          _______________  Hospital Representative                              Date                                Time

## 2025-02-19 ENCOUNTER — TELEPHONE (OUTPATIENT)
Dept: OBGYN CLINIC | Facility: CLINIC | Age: 25
End: 2025-02-19

## 2025-02-24 ENCOUNTER — NURSE TRIAGE (OUTPATIENT)
Age: 25
End: 2025-02-24

## 2025-02-24 NOTE — TELEPHONE ENCOUNTER
"Patient calling in to reschedule her appt, she had a message from the office that her 3/31/25 appt needed rescheduled.  She reports pain and cramping for a few months, and was told in the ED when seen for abdominal pain in Jan 2025 that her IUD was malpositioned.  Denies severe pain and bleeding. Took at HPT 1 week ago just to be sure, it was negative.  LMP 2/10/25.  Patient unable to wait on the line for rescheduling, will send to clerical to reach out to patient.      Reason for Disposition   Cannot feel IUD string or worried that IUD is not in right place (e.g., string longer than usual, can feel hard plastic of IUD)    Answer Assessment - Initial Assessment Questions  1. TYPE: \"What type of IUD do you have?\"       Narciso  2. START DATE:  \"When was your IUD inserted?\" (e.g., date; weeks, months, years ago)       3-4 years  3. SYMPTOM: \"What is the main symptom (or question) you're concerned about?\"       Cramping, pain  4. ONSET: \"When did the  cramping start?\"      A few months ago  5. VAGINAL BLEEDING: \"Are you having any unusual vaginal bleeding?\"       intermittent  6. ABDOMEN OR PELVIC PAIN: \"Are you having any pain in your abdomen or pelvic area?\" (Scale: 0, 1-10; none, mild, moderate, severe)      6/10  7. FEVER: \"Is there a fever?\" If Yes, ask: \"What is the temperature, how was it measured, and when did it start?\"      fevers  8. PREGNANCY: \"Are you concerned that you might be pregnant?\" \"When was your last menstrual period?\"      LMP: 2/10    Protocols used: Contraception - IUD Symptoms and Questions-Adult-[place]    "